# Patient Record
(demographics unavailable — no encounter records)

---

## 2021-01-26 NOTE — REPVR
PROCEDURE INFORMATION: 

Exam: XR Left Knee 

Exam date and time: 1/26/2021 3:15 AM 

Age: 30 years old 

Clinical indication: Pain; Knee; Left; Additional info: Trauma 



TECHNIQUE: 

Imaging protocol: XR Left knee. 

Views: 4 or more views. 



COMPARISON: 

No relevant prior studies available. 



FINDINGS: 

Bones/joints: There is a physiologic amount of joint fluid. There is no 

evidence of acute fracture or dislocation. Joint spaces are preserved. No 

erosions or osteophytes are identified. There is slight lateral subluxation of 

the patella. 

Soft tissues: Normal. 



IMPRESSION: 

1. No acute bony abnormality or joint effusion identified. 

2. Slight lateral subluxation of the patella. 



Electronically signed by: Mindy Munoz On 01/26/2021  04:01:10 AM